# Patient Record
Sex: FEMALE | Race: WHITE | Employment: OTHER | URBAN - METROPOLITAN AREA
[De-identification: names, ages, dates, MRNs, and addresses within clinical notes are randomized per-mention and may not be internally consistent; named-entity substitution may affect disease eponyms.]

---

## 2017-03-13 ENCOUNTER — TELEPHONE (OUTPATIENT)
Dept: GASTROENTEROLOGY | Facility: OUTPATIENT CENTER | Age: 62
End: 2017-03-13

## 2017-03-15 ENCOUNTER — TELEPHONE (OUTPATIENT)
Dept: GASTROENTEROLOGY | Facility: OUTPATIENT CENTER | Age: 62
End: 2017-03-15

## 2017-03-15 NOTE — TELEPHONE ENCOUNTER
Patient taking any blood thinners ? no    Heart disease ? denies    Lung disease ? denies      Sleep apnea ? denies    Diabetic ? denies    Kidney disease ? denies    Dialysis ? n/a    Electronic implanted medical devices ? denies    Are you taking any narcotic pain medication ?  no What is your daily dosage ?    PTSD ? n/a    Prep instructions reviewed with patient ? Instructions,  policy, MAC sedation plan reviewed. Pt. Has had exam before. Advised pt. To have someone stay with her post exam    Pharmacy : [facility]    Indication for procedure : hx of polyps    Referring provider : Dr. Luis Enrique Becerra

## 2017-03-20 ENCOUNTER — TRANSFERRED RECORDS (OUTPATIENT)
Dept: HEALTH INFORMATION MANAGEMENT | Facility: CLINIC | Age: 62
End: 2017-03-20

## 2017-09-17 ENCOUNTER — HEALTH MAINTENANCE LETTER (OUTPATIENT)
Age: 62
End: 2017-09-17

## 2019-08-15 ENCOUNTER — TELEPHONE (OUTPATIENT)
Dept: OTHER | Facility: CLINIC | Age: 64
End: 2019-08-15

## 2019-11-09 ENCOUNTER — HEALTH MAINTENANCE LETTER (OUTPATIENT)
Age: 64
End: 2019-11-09

## 2020-02-23 ENCOUNTER — HEALTH MAINTENANCE LETTER (OUTPATIENT)
Age: 65
End: 2020-02-23

## 2020-12-06 ENCOUNTER — HEALTH MAINTENANCE LETTER (OUTPATIENT)
Age: 65
End: 2020-12-06

## 2020-12-10 ENCOUNTER — TELEPHONE (OUTPATIENT)
Dept: GASTROENTEROLOGY | Facility: CLINIC | Age: 65
End: 2020-12-10

## 2020-12-10 DIAGNOSIS — Z12.11 SCREENING FOR COLON CANCER: Primary | ICD-10-CM

## 2020-12-10 NOTE — TELEPHONE ENCOUNTER
Left message for patient to call back to schedule colonoscopy with Dr. Becerra.     Procedure: Lower Endoscopy Schedule with Dr. Becerra   Lower Endoscopy Type: Colonoscopy    Purpose of Colonoscopy Procedure: Screening    Colonoscopy Sedation: Conscious/Moderate    Preferred Location: [facility]    Scheduling Instructions: If you have not heard from the scheduling office within 2 business days, please call [phone].      Dx: Screening for colon cancer [Z12.11]

## 2021-04-11 ENCOUNTER — HEALTH MAINTENANCE LETTER (OUTPATIENT)
Age: 66
End: 2021-04-11

## 2021-09-26 ENCOUNTER — HEALTH MAINTENANCE LETTER (OUTPATIENT)
Age: 66
End: 2021-09-26

## 2021-11-20 ENCOUNTER — HEALTH MAINTENANCE LETTER (OUTPATIENT)
Age: 66
End: 2021-11-20

## 2022-05-07 ENCOUNTER — HEALTH MAINTENANCE LETTER (OUTPATIENT)
Age: 67
End: 2022-05-07

## 2023-01-01 ENCOUNTER — HEALTH MAINTENANCE LETTER (OUTPATIENT)
Age: 68
End: 2023-01-01